# Patient Record
Sex: MALE
[De-identification: names, ages, dates, MRNs, and addresses within clinical notes are randomized per-mention and may not be internally consistent; named-entity substitution may affect disease eponyms.]

---

## 2021-03-25 ENCOUNTER — NURSE TRIAGE (OUTPATIENT)
Dept: OTHER | Facility: CLINIC | Age: 61
End: 2021-03-25

## 2022-02-27 NOTE — TELEPHONE ENCOUNTER
Answer Assessment - Initial Assessment Questions  1. REASON FOR CALL or QUESTION: \"What is your reason for calling today? \" or \"How can I best help you? \" or \"What question do you have that I can help answer? \"      Has questions about covid. Concerning when will he be able to be retested, will he get worse, and can he take the second covid vaccine. Explained to patient that would be up to his dr about the retesting and I could no predict the course of his illness. Info given Vaccination of persons with current SARS-CoV-2 infection should be deferred until the person has recovered from acute  illness and they can discontinue isolation. Protocols used: INFORMATION ONLY CALL - NO TRIAGE-ADULT-AH    Call from the [facility] line. No triage, info only. 02-Mar-2022